# Patient Record
Sex: FEMALE | URBAN - NONMETROPOLITAN AREA
[De-identification: names, ages, dates, MRNs, and addresses within clinical notes are randomized per-mention and may not be internally consistent; named-entity substitution may affect disease eponyms.]

---

## 2024-05-07 ENCOUNTER — APPOINTMENT (RX ONLY)
Dept: URBAN - NONMETROPOLITAN AREA CLINIC 13 | Facility: CLINIC | Age: 34
Setting detail: DERMATOLOGY
End: 2024-05-07

## 2024-05-07 DIAGNOSIS — Z41.9 ENCOUNTER FOR PROCEDURE FOR PURPOSES OTHER THAN REMEDYING HEALTH STATE, UNSPECIFIED: ICD-10-CM

## 2024-05-07 PROCEDURE — ? COSMETIC CONSULTATION: BOTOX

## 2024-05-07 PROCEDURE — ? BOTOX

## 2024-05-07 PROCEDURE — ? COSMETIC CONSULTATION: Q SWITCHED LASER

## 2024-05-07 PROCEDURE — ? COSMETIC CONSULTATION: FILLERS

## 2024-05-07 ASSESSMENT — LOCATION SIMPLE DESCRIPTION DERM
LOCATION SIMPLE: LEFT LIP
LOCATION SIMPLE: RIGHT UPPER LIP
LOCATION SIMPLE: RIGHT LIP

## 2024-05-07 ASSESSMENT — LOCATION DETAILED DESCRIPTION DERM
LOCATION DETAILED: LEFT UPPER CUTANEOUS LIP
LOCATION DETAILED: RIGHT SUPERIOR VERMILION LIP
LOCATION DETAILED: RIGHT SUPERIOR VERMILION BORDER

## 2024-05-07 ASSESSMENT — LOCATION ZONE DERM: LOCATION ZONE: LIP

## 2024-05-07 NOTE — PROCEDURE: BOTOX
L Brow Units: 0
Show Orbicularis Oculi Units: Yes
Comments: Patient encouraged to come back in 2 weeks for touch up if needed.\\n\\n Patient agrees with plan and verbalizes understanding.
Show Right And Left Periorbital Units: No
Additional Area 1 Location: Lateral brow lift
Additional Area 2 Units: 4
Additional Area 5 Location: Infraorbital eyelids
Expiration Date (Month Year): 04/2026
Incrementing Botox Units: By 0.5 Units
Price (Use Numbers Only, No Special Characters Or $): 56
Additional Area 4 Location: Brow lift
Consent obtained through EMA charting. Risks include but not limited to lid/brow ptosis, bruising, swelling, diplopia, temporary effect, incomplete chemical denervation.\\n\\nBotox administered per written protocol per Tatiana Grove MD.\\n\\nPhotos taken prior to injections.
Detail Level: Detailed
Lot #: [id number]
Additional Area 3 Location: lower fan crows feet
Additional Area 6 Location: Luba lift
Post-Care Instructions: Instructed to not lie down for 4 hours, do not massage areas injected, avoid headbands/hats if forehead was treated, and limit physical activity and extreme heat for 24 hours.
Additional Area 2 Location: Upper [place]-Oral Lines

## 2024-05-14 ENCOUNTER — APPOINTMENT (RX ONLY)
Dept: URBAN - NONMETROPOLITAN AREA CLINIC 13 | Facility: CLINIC | Age: 34
Setting detail: DERMATOLOGY
End: 2024-05-14

## 2024-05-14 DIAGNOSIS — Z41.9 ENCOUNTER FOR PROCEDURE FOR PURPOSES OTHER THAN REMEDYING HEALTH STATE, UNSPECIFIED: ICD-10-CM

## 2024-05-14 PROCEDURE — ? LUTRONIC SPECTRA

## 2024-05-14 ASSESSMENT — LOCATION SIMPLE DESCRIPTION DERM
LOCATION SIMPLE: LEFT HAND
LOCATION SIMPLE: LEFT WRIST
LOCATION SIMPLE: RIGHT HAND
LOCATION SIMPLE: RIGHT WRIST

## 2024-05-14 ASSESSMENT — LOCATION ZONE DERM
LOCATION ZONE: HAND
LOCATION ZONE: ARM

## 2024-05-14 ASSESSMENT — LOCATION DETAILED DESCRIPTION DERM
LOCATION DETAILED: LEFT DORSAL INDEX FINGER METACARPOPHALANGEAL JOINT
LOCATION DETAILED: LEFT DORSAL MIDDLE FINGER METACARPOPHALANGEAL JOINT
LOCATION DETAILED: RIGHT DORSAL WRIST
LOCATION DETAILED: RIGHT ULNAR DORSAL HAND
LOCATION DETAILED: LEFT RADIAL DORSAL HAND
LOCATION DETAILED: RIGHT RADIAL DORSAL HAND
LOCATION DETAILED: LEFT ULNAR DORSAL HAND
LOCATION DETAILED: LEFT LATERAL DORSAL WRIST
LOCATION DETAILED: LEFT DORSAL WRIST
LOCATION DETAILED: LEFT DORSAL RING FINGER METACARPOPHALANGEAL JOINT

## 2024-05-14 NOTE — PROCEDURE: LUTRONIC SPECTRA
Fluence: 1
External Cooling Fan Speed: 0
Spot Size In Mm: 2
Endpoint: Immediate endpoint whitening and frosting. Triamcinolone 0.1% applied per verbal order by Tatiana Grove MD. Ice pack provided. Post care reviewed with patient.
Consent: Electronic consent obtained, risks reviewed including but not limited to crusting, scabbing, blistering, scarring, darker or lighter pigmentary change, systemic reactions, ulceration, incidental hair removal, bruising, and/or incomplete removal.\\n\\nTreatment adminstered per written protocol per Tatiana Grove MD.
Spot Size In Mm: 3
Price (Use Numbers Only, No Special Characters Or $): 580
Post-Care Instructions: Triamcinolone 0.1% applied per written order by Tatiana Grove MD. Ice pack provided. I reviewed with the patient in detail post-care instructions. Patient should avoid sunlight and wear sun protection.
Number Of Pulses: 253
Detail Level: Detailed
Was Spectra Carbon Lotion Applied?: Yes

## 2024-08-14 ENCOUNTER — APPOINTMENT (RX ONLY)
Dept: URBAN - NONMETROPOLITAN AREA CLINIC 13 | Facility: CLINIC | Age: 34
Setting detail: DERMATOLOGY
End: 2024-08-14

## 2024-08-14 DIAGNOSIS — Z41.9 ENCOUNTER FOR PROCEDURE FOR PURPOSES OTHER THAN REMEDYING HEALTH STATE, UNSPECIFIED: ICD-10-CM

## 2024-08-14 PROCEDURE — ? BOTOX

## 2024-08-14 ASSESSMENT — LOCATION DETAILED DESCRIPTION DERM
LOCATION DETAILED: LEFT UPPER CUTANEOUS LIP
LOCATION DETAILED: RIGHT UPPER CUTANEOUS LIP

## 2024-08-14 ASSESSMENT — LOCATION SIMPLE DESCRIPTION DERM
LOCATION SIMPLE: RIGHT LIP
LOCATION SIMPLE: LEFT LIP

## 2024-08-14 ASSESSMENT — LOCATION ZONE DERM: LOCATION ZONE: LIP

## 2024-08-14 NOTE — PROCEDURE: BOTOX
Additional Area 3 Units: 0
Post-Care Instructions: Instructed to not lie down for 4 hours, do not massage areas injected, avoid headbands/hats if forehead was treated, and limit physical activity and extreme heat for 24 hours.
Additional Area 1 Location: brow lift
Show Lcl Units: No
Comments: Patient encouraged to come back in 2 weeks for touch up if needed.\\n\\n Patient agrees with plan and verbalizes understanding.
Show Lateral Platysmal Band Units: Yes
Incrementing Botox Units: By 0.5 Units
Additional Area 4 Location: Upper marcia-oral rhytids
Detail Level: Detailed
Expiration Date (Month Year): 08/2026
Lot #: [id number]
Additional Area 4 Units: 4
Additional Area 3 Location: Chin
Price (Use Numbers Only, No Special Characters Or $): 56
Additional Area 6 Location: Luba lift
Additional Area 2 Location: Lower [facility] crows feet
Consent obtained through EMA charting. Risks include but not limited to lid/brow ptosis, bruising, swelling, diplopia, temporary effect, incomplete chemical denervation.\\n\\nBotox administered per written protocol per Tatiana Grove MD.\\n\\nPhotos taken prior to injections.
Additional Area 5 Location: Infraorbital eyelids

## 2024-10-08 ENCOUNTER — APPOINTMENT (RX ONLY)
Dept: URBAN - NONMETROPOLITAN AREA CLINIC 13 | Facility: CLINIC | Age: 34
Setting detail: DERMATOLOGY
End: 2024-10-08

## 2024-10-08 DIAGNOSIS — Z41.9 ENCOUNTER FOR PROCEDURE FOR PURPOSES OTHER THAN REMEDYING HEALTH STATE, UNSPECIFIED: ICD-10-CM

## 2024-10-08 PROCEDURE — ? BOTOX

## 2024-10-08 ASSESSMENT — LOCATION DETAILED DESCRIPTION DERM
LOCATION DETAILED: RIGHT UPPER CUTANEOUS LIP
LOCATION DETAILED: LEFT UPPER CUTANEOUS LIP

## 2024-10-08 ASSESSMENT — LOCATION ZONE DERM: LOCATION ZONE: LIP

## 2024-10-08 ASSESSMENT — LOCATION SIMPLE DESCRIPTION DERM
LOCATION SIMPLE: RIGHT LIP
LOCATION SIMPLE: LEFT LIP

## 2024-10-08 NOTE — PROCEDURE: BOTOX
Show Masseter Units: Yes
Anterior Platysmal Bands Units: 0
Expiration Date (Month Year): 12/2026
Lot #: [id number]
Additional Area 3 Location: Chin
Incrementing Botox Units: By 0.5 Units
Additional Area 6 Location: Luba lift
Show Ucl Units: No
Price (Use Numbers Only, No Special Characters Or $): 56
Dilution (U/0.1 Cc): 4
Additional Area 2 Location: Bilateral lower lash line
Additional Area 5 Location: Infraorbital eyelids
Detail Level: Detailed
Additional Area 1 Location: Brow lift
Consent obtained through EMA charting. Risks include but not limited to lid/brow ptosis, bruising, swelling, diplopia, temporary effect, incomplete chemical denervation.\\n\\nBotox administered per written protocol per Tatiana Grove MD.\\n\\nPhotos taken prior to injections.
Comments: Patient encouraged to come back in 2 weeks for touch up if needed.\\n\\n Patient agrees with plan and verbalizes understanding.
Post-Care Instructions: Instructed to not lie down for 4 hours, do not massage areas injected, avoid headbands/hats if forehead was treated, and limit physical activity and extreme heat for 24 hours.
Additional Area 4 Location: Upper marcia-oral rhytids

## 2024-11-21 ENCOUNTER — APPOINTMENT (RX ONLY)
Dept: URBAN - NONMETROPOLITAN AREA CLINIC 13 | Facility: CLINIC | Age: 34
Setting detail: DERMATOLOGY
End: 2024-11-21

## 2024-11-21 DIAGNOSIS — Z41.9 ENCOUNTER FOR PROCEDURE FOR PURPOSES OTHER THAN REMEDYING HEALTH STATE, UNSPECIFIED: ICD-10-CM

## 2024-11-21 PROCEDURE — ? BOTOX

## 2024-11-21 ASSESSMENT — LOCATION DETAILED DESCRIPTION DERM
LOCATION DETAILED: LEFT UPPER CUTANEOUS LIP
LOCATION DETAILED: RIGHT UPPER CUTANEOUS LIP

## 2024-11-21 ASSESSMENT — LOCATION SIMPLE DESCRIPTION DERM
LOCATION SIMPLE: LEFT LIP
LOCATION SIMPLE: RIGHT LIP

## 2024-11-21 ASSESSMENT — LOCATION ZONE DERM: LOCATION ZONE: LIP

## 2024-11-21 NOTE — PROCEDURE: BOTOX
Show Nasal Units: Yes
Periorbital Skin Units: 0
Show Right And Left Periorbital Units: No
Additional Area 6 Location: Luba lift
Price (Use Numbers Only, No Special Characters Or $): 56
Dilution (U/0.1 Cc): 4
Additional Area 1 Location: Brow lift
Additional Area 5 Location: Infraorbital eyelids
Consent obtained through EMA charting. Risks include but not limited to lid/brow ptosis, bruising, swelling, diplopia, temporary effect, incomplete chemical denervation.\\n\\nBotox administered per written protocol per Tatiana Grove MD.\\n\\nPhotos taken prior to injections.
Comments: Patient encouraged to come back in 2 weeks for touch up if needed.\\n\\n Patient agrees with plan and verbalizes understanding.
Additional Area 4 Location: Upper marcia-oral rhytids
Post-Care Instructions: Instructed to not lie down for 4 hours, do not massage areas injected, avoid headbands/hats if forehead was treated, and limit physical activity and extreme heat for 24 hours.
Incrementing Botox Units: By 0.5 Units
Additional Area 3 Location: Chin
Expiration Date (Month Year): 02/2027
Lot #: [id number]
Detail Level: Detailed
Additional Area 2 Location: Right lower lash line

## 2025-04-10 ENCOUNTER — APPOINTMENT (OUTPATIENT)
Dept: URBAN - NONMETROPOLITAN AREA CLINIC 13 | Facility: CLINIC | Age: 35
Setting detail: DERMATOLOGY
End: 2025-04-10

## 2025-04-10 DIAGNOSIS — Z41.9 ENCOUNTER FOR PROCEDURE FOR PURPOSES OTHER THAN REMEDYING HEALTH STATE, UNSPECIFIED: ICD-10-CM

## 2025-04-10 PROCEDURE — ? BOTOX

## 2025-04-10 ASSESSMENT — LOCATION DETAILED DESCRIPTION DERM
LOCATION DETAILED: LEFT UPPER CUTANEOUS LIP
LOCATION DETAILED: RIGHT SUPERIOR VERMILION BORDER
LOCATION DETAILED: RIGHT UPPER CUTANEOUS LIP

## 2025-04-10 ASSESSMENT — LOCATION SIMPLE DESCRIPTION DERM
LOCATION SIMPLE: RIGHT LIP
LOCATION SIMPLE: LEFT LIP
LOCATION SIMPLE: RIGHT UPPER LIP

## 2025-04-10 ASSESSMENT — LOCATION ZONE DERM: LOCATION ZONE: LIP

## 2025-06-11 ENCOUNTER — APPOINTMENT (OUTPATIENT)
Dept: URBAN - NONMETROPOLITAN AREA CLINIC 13 | Facility: CLINIC | Age: 35
Setting detail: DERMATOLOGY
End: 2025-06-11

## 2025-06-11 DIAGNOSIS — Z41.9 ENCOUNTER FOR PROCEDURE FOR PURPOSES OTHER THAN REMEDYING HEALTH STATE, UNSPECIFIED: ICD-10-CM

## 2025-06-11 PROCEDURE — ? BOTOX

## 2025-06-11 PROCEDURE — ? COSMETIC CONSULTATION: FILLERS

## 2025-06-11 PROCEDURE — ? COSMETIC CONSULTATION: Q SWITCHED LASER

## 2025-06-11 PROCEDURE — ? COSMETIC CONSULTATION: BOTOX

## 2025-06-11 ASSESSMENT — LOCATION DETAILED DESCRIPTION DERM
LOCATION DETAILED: RIGHT UPPER CUTANEOUS LIP
LOCATION DETAILED: LEFT UPPER CUTANEOUS LIP

## 2025-06-11 ASSESSMENT — LOCATION ZONE DERM: LOCATION ZONE: LIP

## 2025-06-11 ASSESSMENT — LOCATION SIMPLE DESCRIPTION DERM
LOCATION SIMPLE: RIGHT LIP
LOCATION SIMPLE: LEFT LIP

## 2025-06-11 NOTE — PROCEDURE: BOTOX
Show Glabellar Units: Yes
Left Pupillary Line Units: 0
Show Ucl Units: No
Additional Area 1 Location: Brow lift
Consent obtained through EMA charting. Risks include but not limited to lid/brow ptosis, bruising, swelling, diplopia, temporary effect, incomplete chemical denervation.\\n\\nBotox administered per written protocol per Tatiana Grove MD.\\n\\nPhotos taken prior to injections.
Additional Area 5 Location: Infraorbital eyelids
Post-Care Instructions: Instructed to not lie down for 4 hours, do not massage areas injected, avoid headbands/hats if forehead was treated, and limit physical activity and extreme heat for 24 hours.
Additional Area 4 Location: Upper marcia-oral rhytids
Comments: Patient encouraged to come back in 2 weeks for touch up if needed.\\n\\n Patient agrees with plan and verbalizes understanding.
Additional Area 3 Location: Chin
Additional Area 4 Units: 4
Expiration Date (Month Year): 06/2027
Incrementing Botox Units: By 0.5 Units
Lot #: [id number]
Additional Area 2 Location: Right Lower crows feet
Price (Use Numbers Only, No Special Characters Or $): 56
Detail Level: Detailed
Additional Area 6 Location: Luba lift

## 2025-07-16 ENCOUNTER — APPOINTMENT (OUTPATIENT)
Dept: URBAN - NONMETROPOLITAN AREA CLINIC 13 | Facility: CLINIC | Age: 35
Setting detail: DERMATOLOGY
End: 2025-07-16

## 2025-07-16 DIAGNOSIS — Z71.89 OTHER SPECIFIED COUNSELING: ICD-10-CM

## 2025-07-16 DIAGNOSIS — D22 MELANOCYTIC NEVI: ICD-10-CM

## 2025-07-16 DIAGNOSIS — Q819 OTHER SPECIFIED ANOMALIES OF SKIN: ICD-10-CM

## 2025-07-16 DIAGNOSIS — L30.8 OTHER SPECIFIED DERMATITIS: ICD-10-CM

## 2025-07-16 DIAGNOSIS — L81.4 OTHER MELANIN HYPERPIGMENTATION: ICD-10-CM

## 2025-07-16 DIAGNOSIS — Q826 OTHER SPECIFIED ANOMALIES OF SKIN: ICD-10-CM

## 2025-07-16 DIAGNOSIS — L82.1 OTHER SEBORRHEIC KERATOSIS: ICD-10-CM

## 2025-07-16 DIAGNOSIS — D18.0 HEMANGIOMA: ICD-10-CM

## 2025-07-16 DIAGNOSIS — L82.0 INFLAMED SEBORRHEIC KERATOSIS: ICD-10-CM

## 2025-07-16 DIAGNOSIS — Q828 OTHER SPECIFIED ANOMALIES OF SKIN: ICD-10-CM

## 2025-07-16 DIAGNOSIS — L57.8 OTHER SKIN CHANGES DUE TO CHRONIC EXPOSURE TO NONIONIZING RADIATION: ICD-10-CM

## 2025-07-16 PROBLEM — D22.5 MELANOCYTIC NEVI OF TRUNK: Status: ACTIVE | Noted: 2025-07-16

## 2025-07-16 PROBLEM — D18.01 HEMANGIOMA OF SKIN AND SUBCUTANEOUS TISSUE: Status: ACTIVE | Noted: 2025-07-16

## 2025-07-16 PROBLEM — L85.8 OTHER SPECIFIED EPIDERMAL THICKENING: Status: ACTIVE | Noted: 2025-07-16

## 2025-07-16 PROCEDURE — ? LIQUID NITROGEN

## 2025-07-16 PROCEDURE — ? COUNSELING

## 2025-07-16 PROCEDURE — ? PRESCRIPTION MEDICATION MANAGEMENT

## 2025-07-16 ASSESSMENT — LOCATION ZONE DERM
LOCATION ZONE: FACE
LOCATION ZONE: TRUNK
LOCATION ZONE: HAND
LOCATION ZONE: FACE
LOCATION ZONE: ARM

## 2025-07-16 ASSESSMENT — LOCATION DETAILED DESCRIPTION DERM
LOCATION DETAILED: RIGHT RADIAL PALM
LOCATION DETAILED: RIGHT DORSAL WRIST
LOCATION DETAILED: RIGHT DORSAL INDEX FINGER METACARPOPHALANGEAL JOINT
LOCATION DETAILED: LEFT SUPERIOR MEDIAL UPPER BACK
LOCATION DETAILED: RIGHT MEDIAL UPPER BACK
LOCATION DETAILED: LEFT INFERIOR CENTRAL MALAR CHEEK
LOCATION DETAILED: LEFT SUPERIOR MEDIAL MIDBACK
LOCATION DETAILED: LEFT RADIAL DORSAL HAND
LOCATION DETAILED: LEFT ULNAR DORSAL HAND
LOCATION DETAILED: RIGHT CENTRAL MALAR CHEEK
LOCATION DETAILED: RIGHT CENTRAL MALAR CHEEK
LOCATION DETAILED: LEFT THENAR EMINENCE
LOCATION DETAILED: RIGHT ULNAR DORSAL HAND
LOCATION DETAILED: LEFT LATERAL MALAR CHEEK
LOCATION DETAILED: RIGHT RADIAL DORSAL HAND

## 2025-07-16 ASSESSMENT — LOCATION SIMPLE DESCRIPTION DERM
LOCATION SIMPLE: LEFT HAND
LOCATION SIMPLE: RIGHT WRIST
LOCATION SIMPLE: LEFT LOWER BACK
LOCATION SIMPLE: LEFT CHEEK
LOCATION SIMPLE: RIGHT HAND
LOCATION SIMPLE: RIGHT CHEEK
LOCATION SIMPLE: RIGHT CHEEK
LOCATION SIMPLE: RIGHT UPPER BACK
LOCATION SIMPLE: LEFT CHEEK
LOCATION SIMPLE: LEFT UPPER BACK

## 2025-08-13 ENCOUNTER — APPOINTMENT (OUTPATIENT)
Dept: URBAN - NONMETROPOLITAN AREA CLINIC 13 | Facility: CLINIC | Age: 35
Setting detail: DERMATOLOGY
End: 2025-08-13

## 2025-08-13 DIAGNOSIS — Z41.9 ENCOUNTER FOR PROCEDURE FOR PURPOSES OTHER THAN REMEDYING HEALTH STATE, UNSPECIFIED: ICD-10-CM

## 2025-08-13 PROCEDURE — ? BOTOX

## 2025-08-13 ASSESSMENT — LOCATION DETAILED DESCRIPTION DERM
LOCATION DETAILED: RIGHT UPPER CUTANEOUS LIP
LOCATION DETAILED: LEFT UPPER CUTANEOUS LIP

## 2025-08-13 ASSESSMENT — LOCATION SIMPLE DESCRIPTION DERM
LOCATION SIMPLE: RIGHT LIP
LOCATION SIMPLE: LEFT LIP

## 2025-08-13 ASSESSMENT — LOCATION ZONE DERM: LOCATION ZONE: LIP
